# Patient Record
Sex: MALE | Race: OTHER
[De-identification: names, ages, dates, MRNs, and addresses within clinical notes are randomized per-mention and may not be internally consistent; named-entity substitution may affect disease eponyms.]

---

## 2018-09-27 ENCOUNTER — HOSPITAL ENCOUNTER (OUTPATIENT)
Dept: HOSPITAL 80 - FED | Age: 33
Discharge: HOME | End: 2018-09-27
Attending: SURGERY
Payer: COMMERCIAL

## 2018-09-27 VITALS — SYSTOLIC BLOOD PRESSURE: 115 MMHG | DIASTOLIC BLOOD PRESSURE: 85 MMHG

## 2018-09-27 DIAGNOSIS — K35.80: Primary | ICD-10-CM

## 2018-09-27 DIAGNOSIS — I10: ICD-10-CM

## 2018-09-27 LAB — PLATELET # BLD: 198 10^3/UL (ref 150–400)

## 2018-09-27 PROCEDURE — 0DTJ4ZZ RESECTION OF APPENDIX, PERCUTANEOUS ENDOSCOPIC APPROACH: ICD-10-PCS | Performed by: SURGERY

## 2018-09-27 RX ADMIN — Medication PRN MCG: at 16:14

## 2018-09-27 RX ADMIN — Medication PRN MCG: at 16:25
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
## 2018-09-27 NOTE — GOP
DATE OF OPERATION:  09/27/2018



SURGEON:  Cecilia Perez MD



ANESTHESIA:  General.



ANESTHESIOLOGIST:  Nigel Christianson MD



PREOPERATIVE DIAGNOSIS:  Acute appendicitis.



POSTOPERATIVE DIAGNOSIS:  Same.



PROCEDURE PERFORMED:  Laparoscopic appendectomy.



FINDINGS:  Very inflamed appendix.



SPECIMENS:  Appendix.



ESTIMATED BLOOD LOSS:  10 cc.



INDICATIONS:  The patient is a 33-year-old who had 3 days of abdominal pain.  CT scan showed acute ap
pendicitis.



DESCRIPTION OF PROCEDURE:  Patient was brought into the operating room, placed supine on the table, a
nd general anesthesia was administered.  His abdomen was prepped and draped in the usual sterile [facility]
ion.  I infiltrated all sites with 0.5% Marcaine prior to making incisions. 



I made an incision at his umbilicus.  I elevated it.  I inserted the Veress needle.  It passed the ha
nging drop test.  His abdomen insufflated easily to a pressure of 15 mmHg.  

Under direct vision, I placed a 5 mm trocar with a camera at this site.  There were no injuries from 
Veress needle placement.  Under direct vision, I placed a 5 mm suprapubic trocar and a 10 mm trocar i
n the left lower quadrant.  



His appendix was adhered to the cecum and the lateral sidewall.  I gently peeled this away from the s
idewall and divided the mesoappendix with the Harmonic Scalpel.  I transected the base with an Endo-G
IA 35 white load.  There was a small amount of bleeding from the mesenteric fat, which was controlled
 with Harmonic Scalpel.  Suction irrigation was performed.  The appendix was placed in an EndoCatch b
ag and removed via the 10 mm trocar.  Abdomen explored.  No injuries noted.  



Ports were removed under direct vision.  The abdomen allowed to desufflate.  The fascia at the 10 mm 
trocar site was closed with 0 Vicryl.  Skin closed with 4-0 Monocryl.  Dermabond applied.  He was rocio
kened in the operating room, extubated, and transferred to PACU in stable condition.





Job #:  [id number]

## 2018-09-27 NOTE — PDANEPAE
ANE Past Medical History





- Cardiovascular History


Hx Hypertension: No





- Endocrine History


Hx Diabetes: No





ANE Review of Systems


Review of Systems: 








ANE Patient History





- Allergies


Allergies/Adverse Reactions: 








No Known Allergies Allergy (Verified 09/27/18 11:42)


 








- Home Medications


Home medications: home medication list seen and reviewed


Home Medications: 








NK [No Known Home Meds]  09/27/18 [Last Taken Unknown]








- NPO status


NPO Status: no food or drink >8 hours


NPO Since - Liquids (Date): 09/27/18


NPO Since - Liquids (Time): 10:00


NPO Since - Solids (Date): 09/26/18


NPO Since - Solids (Time): 22:00





- Anes Hx


Anes Hx: no prior problems ([place] in [place])





- Smoking Hx


Smoking Status: Never smoked





ANE Labs/Vital Signs





- Labs


Result Diagrams: 


 09/27/18 12:05





 09/27/18 12:05





- Vital Signs


Blood Pressure: 136/94


Heart Rate: 99


Respiratory Rate: 18


O2 Sat (%): 97


Height: 165.1 cm


Weight: 83.461 kg





ANE Physical Exam





- Airway


Neck exam: FROM


Mallampati Score: Class 2


Mouth exam: poor dentition, beard





- Pulmonary


Pulmonary: no respiratory distress





- Cardiovascular


Cardiovascular: regular rate and rhythym





- ASA Status


ASA Status: II, E





ANE Anesthesia Plan


Anesthesia Plan: general endotracheal anesthesia

## 2018-09-27 NOTE — POSTOPPROG
Post Op Note


Date of Operation: 09/27/18


Surgeon: Cecilia Perez


Anesthesiologist: shaun


Anesthesia: GET(General Endotracheal)


Pre-op Diagnosis: appendicitis


Post-op Diagnosis: same


Indication: 32 yo with acute appendicits


Procedure: lap appy


Findings: very inflamed appendix, near rupture


Inf/Abcess present in the surg proc area at time of surgery?: No


EBL: Minimal


Specimen(s): 





appendix
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
